# Patient Record
Sex: MALE | Race: WHITE | Employment: FULL TIME | URBAN - METROPOLITAN AREA
[De-identification: names, ages, dates, MRNs, and addresses within clinical notes are randomized per-mention and may not be internally consistent; named-entity substitution may affect disease eponyms.]

---

## 2018-11-22 ENCOUNTER — HOSPITAL ENCOUNTER (EMERGENCY)
Age: 53
Discharge: HOME | End: 2018-11-22
Attending: EMERGENCY MEDICINE
Payer: COMMERCIAL

## 2018-11-22 VITALS
DIASTOLIC BLOOD PRESSURE: 71 MMHG | BODY MASS INDEX: 25.19 KG/M2 | WEIGHT: 160.5 LBS | TEMPERATURE: 97.3 F | HEIGHT: 67 IN | RESPIRATION RATE: 17 BRPM | SYSTOLIC BLOOD PRESSURE: 121 MMHG | HEART RATE: 73 BPM | OXYGEN SATURATION: 100 %

## 2018-11-22 DIAGNOSIS — R13.14 PHARYNGOESOPHAGEAL DYSPHAGIA: Primary | ICD-10-CM

## 2018-11-22 LAB
ALBUMIN SERPL-MCNC: 4.2 G/DL (ref 3.5–5)
ALBUMIN/GLOB SERPL: 1.4 {RATIO} (ref 1.1–2.2)
ALP SERPL-CCNC: 76 U/L (ref 45–117)
ALT SERPL-CCNC: 30 U/L (ref 12–78)
ANION GAP SERPL CALC-SCNC: 5 MMOL/L (ref 5–15)
AST SERPL-CCNC: 22 U/L (ref 15–37)
BILIRUB SERPL-MCNC: 0.3 MG/DL (ref 0.2–1)
BUN SERPL-MCNC: 13 MG/DL (ref 6–20)
BUN/CREAT SERPL: 9 (ref 12–20)
CALCIUM SERPL-MCNC: 8.8 MG/DL (ref 8.5–10.1)
CHLORIDE SERPL-SCNC: 106 MMOL/L (ref 97–108)
CO2 SERPL-SCNC: 31 MMOL/L (ref 21–32)
CREAT SERPL-MCNC: 1.38 MG/DL (ref 0.7–1.3)
ERYTHROCYTE [DISTWIDTH] IN BLOOD BY AUTOMATED COUNT: 12.6 % (ref 11.5–14.5)
GLOBULIN SER CALC-MCNC: 2.9 G/DL (ref 2–4)
GLUCOSE SERPL-MCNC: 102 MG/DL (ref 65–100)
HCT VFR BLD AUTO: 45.5 % (ref 36.6–50.3)
HGB BLD-MCNC: 15.3 G/DL (ref 12.1–17)
LIPASE SERPL-CCNC: 90 U/L (ref 73–393)
MCH RBC QN AUTO: 28.5 PG (ref 26–34)
MCHC RBC AUTO-ENTMCNC: 33.6 G/DL (ref 30–36.5)
MCV RBC AUTO: 84.9 FL (ref 80–99)
NRBC # BLD: 0 K/UL (ref 0–0.01)
NRBC BLD-RTO: 0 PER 100 WBC
PLATELET # BLD AUTO: 155 K/UL (ref 150–400)
PMV BLD AUTO: 10.5 FL (ref 8.9–12.9)
POTASSIUM SERPL-SCNC: 4 MMOL/L (ref 3.5–5.1)
PROT SERPL-MCNC: 7.1 G/DL (ref 6.4–8.2)
RBC # BLD AUTO: 5.36 M/UL (ref 4.1–5.7)
SODIUM SERPL-SCNC: 142 MMOL/L (ref 136–145)
WBC # BLD AUTO: 7.7 K/UL (ref 4.1–11.1)

## 2018-11-22 PROCEDURE — 36415 COLL VENOUS BLD VENIPUNCTURE: CPT

## 2018-11-22 PROCEDURE — 74011000250 HC RX REV CODE- 250: Performed by: EMERGENCY MEDICINE

## 2018-11-22 PROCEDURE — 99283 EMERGENCY DEPT VISIT LOW MDM: CPT

## 2018-11-22 PROCEDURE — 83690 ASSAY OF LIPASE: CPT

## 2018-11-22 PROCEDURE — 80053 COMPREHEN METABOLIC PANEL: CPT

## 2018-11-22 PROCEDURE — 85027 COMPLETE CBC AUTOMATED: CPT

## 2018-11-22 PROCEDURE — 74011250637 HC RX REV CODE- 250/637: Performed by: EMERGENCY MEDICINE

## 2018-11-22 RX ORDER — BUPROPION HYDROCHLORIDE 75 MG/1
450 TABLET ORAL EVERY 24 HOURS
COMMUNITY

## 2018-11-22 RX ADMIN — LIDOCAINE HYDROCHLORIDE 40 ML: 20 SOLUTION ORAL; TOPICAL at 21:36

## 2018-11-23 NOTE — DISCHARGE INSTRUCTIONS
Learning About Swallowing Problems  What are swallowing problems? Certain health problems that affect the nervous system can cause trouble swallowing. These conditions include stroke, ALS (also known as Bernie Gehrig's disease), Parkinson's disease, and multiple sclerosis. The muscles and nerves that help move food through the throat and esophagus may not work right. Growths, such as cancer, and other problems with your esophagus can also make it hard to swallow. The esophagus is the tube that leads from your throat to your stomach. How are swallowing problems diagnosed? A doctor or speech therapist will examine you to check for swallowing problems. You may get swallowing tests to check how well your throat muscles work. For these tests, you swallow a special liquid that helps the doctor see your throat and esophagus on an X-ray or video screen. Other tests use a thin, flexible tube called a scope to check for problems with your esophagus. The doctor puts the scope in your mouth and down your throat to look at your esophagus. What are the symptoms? Symptoms of swallowing problems may include:  · Trouble getting food or liquids to go down on the first try. · Gagging, choking, or coughing when you swallow. · Having food or liquids come back up through your throat, mouth, or nose after you swallow. · Feeling like foods or liquids are stuck in some part of your throat or chest.  · Pain when you swallow. How are swallowing problems treated? How swallowing problems are treated depends on the cause. The main goals of treatment will be to help you eat and swallow safely and get good nutrition. This is important for your health and quality of life. You may learn exercises to train your throat muscles to work together so you're able to swallow better. Learning certain ways to put food in your mouth or to position your head while eating may also help.   Your doctor or a speech therapist may recommend changes to your diet to help make it easier to swallow. You may need to avoid certain foods or liquids. You also may need to change the thickness of foods or liquids in your diet. To eat and swallow safely, follow any instructions you get from your doctor or therapist. These ideas may help:  · Sit upright when eating, drinking, and taking pills. · Take small bites of food. Chew completely and swallow before taking another bite. · Take small sips of liquids. · If eating makes you tired, eat smaller but more frequent meals. · Tip your chin down when there is food in your mouth. Where can you learn more? Go to http://qasim-karol.info/. Enter 428 [id number] 8598 in the search box to learn more about \"Learning About Swallowing Problems. \"  Current as of: November 20, 2017  Content Version: 11.8  © 0339-6639 [employer]. Care instructions adapted under license by [employer] (which disclaims liability or warranty for this information). If you have questions about a medical condition or this instruction, always ask your healthcare professional. [place] [address] any warranty or liability for your use of this information.

## 2018-11-23 NOTE — ED NOTES
Dr. Manjinder Harvey reviewed discharge instructions with the patient. The patient verbalized understanding. All questions and concerns were addressed. The patient declined a wheelchair and is discharged ambulatory in the care of family members with instructions and prescriptions in hand. Pt is alert and oriented x 4. Respirations are clear and unlabored.

## 2018-11-23 NOTE — ED NOTES
Care of patient assumed from triage. Patient presents with complaints of nausea and vomiting and states that there was a little bit of blood in his vomit. Patient states the onset of vomiting at approximately 3 hours ago.

## 2022-04-29 ENCOUNTER — OFFICE VISIT (OUTPATIENT)
Dept: ORTHOPEDIC SURGERY | Age: 57
End: 2022-04-29
Payer: COMMERCIAL

## 2022-04-29 VITALS — WEIGHT: 145 LBS | BODY MASS INDEX: 22.76 KG/M2 | HEIGHT: 67 IN

## 2022-04-29 DIAGNOSIS — M25.561 RIGHT KNEE PAIN, UNSPECIFIED CHRONICITY: Primary | ICD-10-CM

## 2022-04-29 DIAGNOSIS — M17.11 LOCALIZED OSTEOARTHRITIS OF RIGHT KNEE: ICD-10-CM

## 2022-04-29 PROCEDURE — 99203 OFFICE O/P NEW LOW 30 MIN: CPT | Performed by: ORTHOPAEDIC SURGERY

## 2022-04-29 RX ORDER — BUDESONIDE AND FORMOTEROL FUMARATE DIHYDRATE 160; 4.5 UG/1; UG/1
2 AEROSOL RESPIRATORY (INHALATION)
COMMUNITY

## 2022-04-29 NOTE — PROGRESS NOTES
Hilary Shaw (: 1965) is a 56 y.o. male, new patient, here for evaluation of the following chief complaint(s):  Knee Pain (right)       ASSESSMENT/PLAN:  Below is the assessment and plan developed based on review of pertinent history, physical exam, labs, studies, and medications. He    1. Right knee pain, unspecified chronicity  -     XR KNEE RT MIN 4 V; Future  -     REFERRAL TO DME  2. Localized osteoarthritis of right knee  -     REFERRAL TO DME      Return if symptoms worsen or fail to improve. SUBJECTIVE/OBJECTIVE:  Hilary Shaw (: 1965) is a 56 y.o. male. He notes aching pain at the anterior medial right knee ongoing for at least a year. He was seen by an outside orthopedic surgeon and underwent a partial medial meniscectomy and chondroplasty for a meniscus root tear in the past.  He notes continued aching pain. He is normally very active and enjoys hiking and bike riding. This has limited some of his activities. He tried a viscosupplementation injection with moderate relief. No Known Allergies    Current Outpatient Medications   Medication Sig    budesonide-formoteroL (Symbicort) 160-4.5 mcg/actuation HFAA Take 2 Puffs by inhalation.  buPROPion (WELLBUTRIN) 75 mg tablet Take 450 mg by mouth every twenty-four (24) hours. No current facility-administered medications for this visit.        Social History     Socioeconomic History    Marital status:      Spouse name: Not on file    Number of children: Not on file    Years of education: Not on file    Highest education level: Not on file   Occupational History    Not on file   Tobacco Use    Smoking status: Never Smoker    Smokeless tobacco: Never Used   Substance and Sexual Activity    Alcohol use: Not on file    Drug use: No    Sexual activity: Yes   Other Topics Concern    Not on file   Social History Narrative    Not on file     Social Determinants of Health     Financial Resource Strain:    [facility] Difficulty of Paying Living Expenses: Not on file   Food Insecurity:     Worried About Running Out of Food in the Last Year: Not on file    Ran Out of Food in the Last Year: Not on file   Transportation Needs:     Lack of Transportation (Medical): Not on file    Lack of Transportation (Non-Medical): Not on file   Physical Activity:     Days of Exercise per Week: Not on file    Minutes of Exercise per Session: Not on file   Stress:     Feeling of Stress : Not on file   Social Connections:     Frequency of Communication with Friends and Family: Not on file    Frequency of Social Gatherings with Friends and Family: Not on file    Attends Restorationist Services: Not on file    Active Member of [address] [place] [address] [employer] or Organizations: Not on file    Attends Club or Organization Meetings: Not on file    Marital Status: Not on file   Intimate Partner Violence:     Fear of Current or Ex-Partner: Not on file    Emotionally Abused: Not on file    Physically Abused: Not on file    Sexually Abused: Not on file   Housing Stability:     Unable to Pay for Housing in the Last Year: Not on file    Number of [place] in the Last Year: Not on file    Unstable Housing in the Last Year: Not on file       History reviewed. No pertinent surgical history. History reviewed. No pertinent family history. REVIEW OF SYSTEMS:  ROS     Positive for: Musculoskeletal    Last edited by Mark Jacobs on 4/29/2022  8:56 AM. (History)        Patient denies any recent fever, chills, nausea, vomiting, chest pain, or shortness of breath. Vitals:  Ht 5' 7\" (1.702 m)   Wt 145 lb (65.8 kg)   BMI 22.71 kg/m²    Body mass index is 22.71 kg/m². PHYSICAL EXAM:  General exam: Patient is awake, alert, and oriented x3. Well-appearing. No acute distress. Ambulates with a normal gait. Right knee: There is mild tenderness palpation at the anterior medial knee. Mild crepitus with range of motion. Normal stability.   No significant effusion noted. Negative Tony's exam.    IMAGING:    XR Results (most recent):  Results from Appointment encounter on 04/29/22    XR KNEE RT MIN 4 V    Narrative  X-rays of the right knee 4 views done today show evidence of moderate medial joint space narrowing with early osteophyte formation at the medial tibial plateau. Early patellofemoral osteophyte formation noted as well. No signs of fracture         Orders Placed This Encounter    XR KNEE RT MIN 4 V     2     Standing Status:   Future     Number of Occurrences:   1     Standing Expiration Date:   8/29/2022   Kirill Mendieta [facility] - REFERRAL TO DME [[id number]]     Referral Priority:   Routine     Referral Type:   Consultation     Referral Reason:   Specialty Services Required     Number of Visits Requested:   1              An electronic signature was used to authenticate this note.   -- Lisa Kam, DO

## 2022-04-29 NOTE — LETTER
4/29/2022    Patient: Ollie Perez   YOB: 1965   Date of Visit: 4/29/2022     Keyur Wolfe MD  [address] [facility]  [address] [id number]  Via Fax: [phone]    Dear Keyur Wolfe MD,      Thank you for referring Mr. Ollie Perez to [facility] [place] for evaluation. My notes for this consultation are attached. If you have questions, please do not hesitate to call me. I look forward to following your patient along with you.       Sincerely,    Arin Lara, DO

## 2023-05-19 RX ORDER — BUDESONIDE AND FORMOTEROL FUMARATE DIHYDRATE 160; 4.5 UG/1; UG/1
2 AEROSOL RESPIRATORY (INHALATION)
COMMUNITY

## 2023-05-19 RX ORDER — BUPROPION HYDROCHLORIDE 75 MG/1
450 TABLET ORAL EVERY 24 HOURS
COMMUNITY